# Patient Record
Sex: FEMALE | Race: WHITE | HISPANIC OR LATINO | Employment: OTHER | URBAN - METROPOLITAN AREA
[De-identification: names, ages, dates, MRNs, and addresses within clinical notes are randomized per-mention and may not be internally consistent; named-entity substitution may affect disease eponyms.]

---

## 2019-06-14 ENCOUNTER — PATIENT OUTREACH (OUTPATIENT)
Dept: HEALTH INFORMATION MANAGEMENT | Facility: OTHER | Age: 49
End: 2019-06-14

## 2019-06-14 ENCOUNTER — ANESTHESIA (OUTPATIENT)
Dept: SURGERY | Facility: MEDICAL CENTER | Age: 49
End: 2019-06-14
Payer: OTHER MISCELLANEOUS

## 2019-06-14 ENCOUNTER — APPOINTMENT (OUTPATIENT)
Dept: RADIOLOGY | Facility: MEDICAL CENTER | Age: 49
End: 2019-06-14
Attending: EMERGENCY MEDICINE
Payer: OTHER MISCELLANEOUS

## 2019-06-14 ENCOUNTER — ANESTHESIA EVENT (OUTPATIENT)
Dept: SURGERY | Facility: MEDICAL CENTER | Age: 49
End: 2019-06-14
Payer: OTHER MISCELLANEOUS

## 2019-06-14 ENCOUNTER — HOSPITAL ENCOUNTER (EMERGENCY)
Facility: MEDICAL CENTER | Age: 49
End: 2019-06-14
Attending: EMERGENCY MEDICINE
Payer: OTHER MISCELLANEOUS

## 2019-06-14 VITALS
HEIGHT: 62 IN | OXYGEN SATURATION: 95 % | RESPIRATION RATE: 16 BRPM | WEIGHT: 180.78 LBS | BODY MASS INDEX: 33.27 KG/M2 | DIASTOLIC BLOOD PRESSURE: 84 MMHG | TEMPERATURE: 98 F | HEART RATE: 79 BPM | SYSTOLIC BLOOD PRESSURE: 182 MMHG

## 2019-06-14 DIAGNOSIS — N83.201 CYST OF RIGHT OVARY: ICD-10-CM

## 2019-06-14 DIAGNOSIS — R10.9 ABDOMINAL PAIN, UNSPECIFIED ABDOMINAL LOCATION: ICD-10-CM

## 2019-06-14 DIAGNOSIS — G89.18 POST-OP PAIN: Primary | ICD-10-CM

## 2019-06-14 PROBLEM — N83.512 TORSION OF LEFT OVARY AND OVARIAN PEDICLE: Status: ACTIVE | Noted: 2019-06-14

## 2019-06-14 PROBLEM — N83.202 OVARIAN CYST, LEFT: Status: ACTIVE | Noted: 2019-06-14

## 2019-06-14 LAB
ALBUMIN SERPL BCP-MCNC: 4.7 G/DL (ref 3.2–4.9)
ALBUMIN/GLOB SERPL: 1.4 G/DL
ALP SERPL-CCNC: 99 U/L (ref 30–99)
ALT SERPL-CCNC: 15 U/L (ref 2–50)
ANION GAP SERPL CALC-SCNC: 11 MMOL/L (ref 0–11.9)
APTT PPP: 25.4 SEC (ref 24.7–36)
AST SERPL-CCNC: 17 U/L (ref 12–45)
B-HCG FREE SERPL-ACNC: <5 MIU/ML
BASOPHILS # BLD AUTO: 0.3 % (ref 0–1.8)
BASOPHILS # BLD: 0.03 K/UL (ref 0–0.12)
BILIRUB SERPL-MCNC: 0.5 MG/DL (ref 0.1–1.5)
BUN SERPL-MCNC: 14 MG/DL (ref 8–22)
CALCIUM SERPL-MCNC: 9.3 MG/DL (ref 8.5–10.5)
CHLORIDE SERPL-SCNC: 102 MMOL/L (ref 96–112)
CO2 SERPL-SCNC: 23 MMOL/L (ref 20–33)
CREAT SERPL-MCNC: 0.76 MG/DL (ref 0.5–1.4)
EOSINOPHIL # BLD AUTO: 0.01 K/UL (ref 0–0.51)
EOSINOPHIL NFR BLD: 0.1 % (ref 0–6.9)
ERYTHROCYTE [DISTWIDTH] IN BLOOD BY AUTOMATED COUNT: 45.8 FL (ref 35.9–50)
GLOBULIN SER CALC-MCNC: 3.3 G/DL (ref 1.9–3.5)
GLUCOSE SERPL-MCNC: 154 MG/DL (ref 65–99)
HCG SERPL QL: NEGATIVE
HCT VFR BLD AUTO: 34.8 % (ref 37–47)
HGB BLD-MCNC: 11.5 G/DL (ref 12–16)
IHCGL IHCGL: NEGATIVE MIU/ML
IMM GRANULOCYTES # BLD AUTO: 0.04 K/UL (ref 0–0.11)
IMM GRANULOCYTES NFR BLD AUTO: 0.4 % (ref 0–0.9)
INR PPP: 0.96 (ref 0.87–1.13)
LIPASE SERPL-CCNC: <3 U/L (ref 11–82)
LYMPHOCYTES # BLD AUTO: 0.9 K/UL (ref 1–4.8)
LYMPHOCYTES NFR BLD: 8.5 % (ref 22–41)
MCH RBC QN AUTO: 30.9 PG (ref 27–33)
MCHC RBC AUTO-ENTMCNC: 33 G/DL (ref 33.6–35)
MCV RBC AUTO: 93.5 FL (ref 81.4–97.8)
MONOCYTES # BLD AUTO: 0.23 K/UL (ref 0–0.85)
MONOCYTES NFR BLD AUTO: 2.2 % (ref 0–13.4)
NEUTROPHILS # BLD AUTO: 9.34 K/UL (ref 2–7.15)
NEUTROPHILS NFR BLD: 88.5 % (ref 44–72)
NRBC # BLD AUTO: 0 K/UL
NRBC BLD-RTO: 0 /100 WBC
PATHOLOGY CONSULT NOTE: NORMAL
PLATELET # BLD AUTO: 246 K/UL (ref 164–446)
PMV BLD AUTO: 10.9 FL (ref 9–12.9)
POTASSIUM SERPL-SCNC: 4 MMOL/L (ref 3.6–5.5)
PROT SERPL-MCNC: 8 G/DL (ref 6–8.2)
PROTHROMBIN TIME: 13 SEC (ref 12–14.6)
RBC # BLD AUTO: 3.72 M/UL (ref 4.2–5.4)
SODIUM SERPL-SCNC: 136 MMOL/L (ref 135–145)
WBC # BLD AUTO: 10.6 K/UL (ref 4.8–10.8)

## 2019-06-14 PROCEDURE — 83690 ASSAY OF LIPASE: CPT

## 2019-06-14 PROCEDURE — 96376 TX/PRO/DX INJ SAME DRUG ADON: CPT

## 2019-06-14 PROCEDURE — 501838 HCHG SUTURE GENERAL: Performed by: SPECIALIST

## 2019-06-14 PROCEDURE — 160036 HCHG PACU - EA ADDL 30 MINS PHASE I: Performed by: SPECIALIST

## 2019-06-14 PROCEDURE — 99291 CRITICAL CARE FIRST HOUR: CPT

## 2019-06-14 PROCEDURE — 160025 RECOVERY II MINUTES (STATS): Performed by: SPECIALIST

## 2019-06-14 PROCEDURE — 160041 HCHG SURGERY MINUTES - EA ADDL 1 MIN LEVEL 4: Performed by: SPECIALIST

## 2019-06-14 PROCEDURE — A9270 NON-COVERED ITEM OR SERVICE: HCPCS | Performed by: ANESTHESIOLOGY

## 2019-06-14 PROCEDURE — 96374 THER/PROPH/DIAG INJ IV PUSH: CPT

## 2019-06-14 PROCEDURE — 96375 TX/PRO/DX INJ NEW DRUG ADDON: CPT

## 2019-06-14 PROCEDURE — 85730 THROMBOPLASTIN TIME PARTIAL: CPT

## 2019-06-14 PROCEDURE — 502703 HCHG DEVICE, LIGASURE V SEALER: Performed by: SPECIALIST

## 2019-06-14 PROCEDURE — 700102 HCHG RX REV CODE 250 W/ 637 OVERRIDE(OP): Performed by: ANESTHESIOLOGY

## 2019-06-14 PROCEDURE — 700111 HCHG RX REV CODE 636 W/ 250 OVERRIDE (IP): Performed by: ANESTHESIOLOGY

## 2019-06-14 PROCEDURE — 500886 HCHG PACK, LAPAROSCOPY: Performed by: SPECIALIST

## 2019-06-14 PROCEDURE — 160048 HCHG OR STATISTICAL LEVEL 1-5: Performed by: SPECIALIST

## 2019-06-14 PROCEDURE — 501577 HCHG TROCAR, STEP 11MM: Performed by: SPECIALIST

## 2019-06-14 PROCEDURE — 700111 HCHG RX REV CODE 636 W/ 250 OVERRIDE (IP): Performed by: EMERGENCY MEDICINE

## 2019-06-14 PROCEDURE — 700105 HCHG RX REV CODE 258: Performed by: SPECIALIST

## 2019-06-14 PROCEDURE — 88305 TISSUE EXAM BY PATHOLOGIST: CPT

## 2019-06-14 PROCEDURE — 84703 CHORIONIC GONADOTROPIN ASSAY: CPT

## 2019-06-14 PROCEDURE — 501579 HCHG TROCAR, STEP 5MM: Performed by: SPECIALIST

## 2019-06-14 PROCEDURE — A4338 INDWELLING CATHETER LATEX: HCPCS | Performed by: SPECIALIST

## 2019-06-14 PROCEDURE — 160035 HCHG PACU - 1ST 60 MINS PHASE I: Performed by: SPECIALIST

## 2019-06-14 PROCEDURE — 84702 CHORIONIC GONADOTROPIN TEST: CPT

## 2019-06-14 PROCEDURE — 85025 COMPLETE CBC W/AUTO DIFF WBC: CPT

## 2019-06-14 PROCEDURE — 160009 HCHG ANES TIME/MIN: Performed by: SPECIALIST

## 2019-06-14 PROCEDURE — 160002 HCHG RECOVERY MINUTES (STAT): Performed by: SPECIALIST

## 2019-06-14 PROCEDURE — 160046 HCHG PACU - 1ST 60 MINS PHASE II: Performed by: SPECIALIST

## 2019-06-14 PROCEDURE — 700101 HCHG RX REV CODE 250: Performed by: ANESTHESIOLOGY

## 2019-06-14 PROCEDURE — 85610 PROTHROMBIN TIME: CPT

## 2019-06-14 PROCEDURE — 500854 HCHG NEEDLE, INSUFFLATION FOR STEP: Performed by: SPECIALIST

## 2019-06-14 PROCEDURE — 500002 HCHG ADHESIVE, DERMABOND: Performed by: SPECIALIST

## 2019-06-14 PROCEDURE — 160047 HCHG PACU  - EA ADDL 30 MINS PHASE II: Performed by: SPECIALIST

## 2019-06-14 PROCEDURE — 94760 N-INVAS EAR/PLS OXIMETRY 1: CPT

## 2019-06-14 PROCEDURE — 74176 CT ABD & PELVIS W/O CONTRAST: CPT

## 2019-06-14 PROCEDURE — 80053 COMPREHEN METABOLIC PANEL: CPT

## 2019-06-14 PROCEDURE — 36415 COLL VENOUS BLD VENIPUNCTURE: CPT

## 2019-06-14 PROCEDURE — 160029 HCHG SURGERY MINUTES - 1ST 30 MINS LEVEL 4: Performed by: SPECIALIST

## 2019-06-14 PROCEDURE — 76856 US EXAM PELVIC COMPLETE: CPT

## 2019-06-14 RX ORDER — HYDROMORPHONE HYDROCHLORIDE 1 MG/ML
0.4 INJECTION, SOLUTION INTRAMUSCULAR; INTRAVENOUS; SUBCUTANEOUS
Status: DISCONTINUED | OUTPATIENT
Start: 2019-06-14 | End: 2019-06-14 | Stop reason: ALTCHOICE

## 2019-06-14 RX ORDER — HALOPERIDOL 5 MG/ML
1 INJECTION INTRAMUSCULAR
Status: DISCONTINUED | OUTPATIENT
Start: 2019-06-14 | End: 2019-06-14 | Stop reason: ALTCHOICE

## 2019-06-14 RX ORDER — OXYCODONE HCL 5 MG/5 ML
5 SOLUTION, ORAL ORAL
Status: DISCONTINUED | OUTPATIENT
Start: 2019-06-14 | End: 2019-06-14 | Stop reason: ALTCHOICE

## 2019-06-14 RX ORDER — DIPHENHYDRAMINE HYDROCHLORIDE 50 MG/ML
12.5 INJECTION INTRAMUSCULAR; INTRAVENOUS
Status: DISCONTINUED | OUTPATIENT
Start: 2019-06-14 | End: 2019-06-14 | Stop reason: ALTCHOICE

## 2019-06-14 RX ORDER — OXYCODONE HCL 5 MG/5 ML
5 SOLUTION, ORAL ORAL
Status: COMPLETED | OUTPATIENT
Start: 2019-06-14 | End: 2019-06-14

## 2019-06-14 RX ORDER — HYDROMORPHONE HYDROCHLORIDE 1 MG/ML
0.1 INJECTION, SOLUTION INTRAMUSCULAR; INTRAVENOUS; SUBCUTANEOUS
Status: DISCONTINUED | OUTPATIENT
Start: 2019-06-14 | End: 2019-06-14 | Stop reason: ALTCHOICE

## 2019-06-14 RX ORDER — KETOROLAC TROMETHAMINE 30 MG/ML
15 INJECTION, SOLUTION INTRAMUSCULAR; INTRAVENOUS ONCE
Status: DISCONTINUED | OUTPATIENT
Start: 2019-06-14 | End: 2019-06-14 | Stop reason: HOSPADM

## 2019-06-14 RX ORDER — MORPHINE SULFATE 4 MG/ML
1 INJECTION, SOLUTION INTRAMUSCULAR; INTRAVENOUS
Status: DISCONTINUED | OUTPATIENT
Start: 2019-06-14 | End: 2019-06-14 | Stop reason: HOSPADM

## 2019-06-14 RX ORDER — HYDROMORPHONE HYDROCHLORIDE 1 MG/ML
0.2 INJECTION, SOLUTION INTRAMUSCULAR; INTRAVENOUS; SUBCUTANEOUS
Status: DISCONTINUED | OUTPATIENT
Start: 2019-06-14 | End: 2019-06-14 | Stop reason: ALTCHOICE

## 2019-06-14 RX ORDER — SODIUM CHLORIDE, SODIUM LACTATE, POTASSIUM CHLORIDE, CALCIUM CHLORIDE 600; 310; 30; 20 MG/100ML; MG/100ML; MG/100ML; MG/100ML
INJECTION, SOLUTION INTRAVENOUS CONTINUOUS
Status: DISCONTINUED | OUTPATIENT
Start: 2019-06-14 | End: 2019-06-14 | Stop reason: HOSPADM

## 2019-06-14 RX ORDER — KETOROLAC TROMETHAMINE 30 MG/ML
30 INJECTION, SOLUTION INTRAMUSCULAR; INTRAVENOUS ONCE
Status: COMPLETED | OUTPATIENT
Start: 2019-06-14 | End: 2019-06-14

## 2019-06-14 RX ORDER — OXYCODONE HCL 10 MG/1
10 TABLET, FILM COATED, EXTENDED RELEASE ORAL ONCE
Status: DISCONTINUED | OUTPATIENT
Start: 2019-06-14 | End: 2019-06-14 | Stop reason: HOSPADM

## 2019-06-14 RX ORDER — HYDROMORPHONE HYDROCHLORIDE 1 MG/ML
1 INJECTION, SOLUTION INTRAMUSCULAR; INTRAVENOUS; SUBCUTANEOUS ONCE
Status: COMPLETED | OUTPATIENT
Start: 2019-06-14 | End: 2019-06-14

## 2019-06-14 RX ORDER — MORPHINE SULFATE 10 MG/ML
5 INJECTION, SOLUTION INTRAMUSCULAR; INTRAVENOUS
Status: DISCONTINUED | OUTPATIENT
Start: 2019-06-14 | End: 2019-06-14 | Stop reason: HOSPADM

## 2019-06-14 RX ORDER — ONDANSETRON 2 MG/ML
4 INJECTION INTRAMUSCULAR; INTRAVENOUS
Status: DISCONTINUED | OUTPATIENT
Start: 2019-06-14 | End: 2019-06-14 | Stop reason: ALTCHOICE

## 2019-06-14 RX ORDER — SODIUM CHLORIDE, SODIUM LACTATE, POTASSIUM CHLORIDE, CALCIUM CHLORIDE 600; 310; 30; 20 MG/100ML; MG/100ML; MG/100ML; MG/100ML
INJECTION, SOLUTION INTRAVENOUS CONTINUOUS
Status: DISCONTINUED | OUTPATIENT
Start: 2019-06-14 | End: 2019-06-14 | Stop reason: ALTCHOICE

## 2019-06-14 RX ORDER — ONDANSETRON 2 MG/ML
4 INJECTION INTRAMUSCULAR; INTRAVENOUS
Status: DISCONTINUED | OUTPATIENT
Start: 2019-06-14 | End: 2019-06-14 | Stop reason: HOSPADM

## 2019-06-14 RX ORDER — OXYCODONE HYDROCHLORIDE AND ACETAMINOPHEN 5; 325 MG/1; MG/1
1 TABLET ORAL EVERY 6 HOURS PRN
Qty: 28 TAB | Refills: 0 | Status: SHIPPED | OUTPATIENT
Start: 2019-06-14 | End: 2019-06-21

## 2019-06-14 RX ORDER — SIMETHICONE 80 MG
80 TABLET,CHEWABLE ORAL EVERY 8 HOURS PRN
Status: DISCONTINUED | OUTPATIENT
Start: 2019-06-14 | End: 2019-06-14 | Stop reason: HOSPADM

## 2019-06-14 RX ORDER — ACETAMINOPHEN 500 MG
1000 TABLET ORAL ONCE
Status: DISCONTINUED | OUTPATIENT
Start: 2019-06-14 | End: 2019-06-14 | Stop reason: HOSPADM

## 2019-06-14 RX ORDER — OXYCODONE HCL 5 MG/5 ML
10 SOLUTION, ORAL ORAL
Status: DISCONTINUED | OUTPATIENT
Start: 2019-06-14 | End: 2019-06-14 | Stop reason: ALTCHOICE

## 2019-06-14 RX ORDER — GABAPENTIN 300 MG/1
300 CAPSULE ORAL ONCE
Status: DISCONTINUED | OUTPATIENT
Start: 2019-06-14 | End: 2019-06-14 | Stop reason: HOSPADM

## 2019-06-14 RX ORDER — CELECOXIB 200 MG/1
200 CAPSULE ORAL ONCE
Status: DISCONTINUED | OUTPATIENT
Start: 2019-06-14 | End: 2019-06-14 | Stop reason: HOSPADM

## 2019-06-14 RX ORDER — KETOROLAC TROMETHAMINE 30 MG/ML
INJECTION, SOLUTION INTRAMUSCULAR; INTRAVENOUS PRN
Status: DISCONTINUED | OUTPATIENT
Start: 2019-06-14 | End: 2019-06-14 | Stop reason: SURG

## 2019-06-14 RX ORDER — ONDANSETRON 2 MG/ML
INJECTION INTRAMUSCULAR; INTRAVENOUS PRN
Status: DISCONTINUED | OUTPATIENT
Start: 2019-06-14 | End: 2019-06-14 | Stop reason: SURG

## 2019-06-14 RX ORDER — HYDROMORPHONE HYDROCHLORIDE 1 MG/ML
1 INJECTION, SOLUTION INTRAMUSCULAR; INTRAVENOUS; SUBCUTANEOUS ONCE
Status: DISCONTINUED | OUTPATIENT
Start: 2019-06-14 | End: 2019-06-14

## 2019-06-14 RX ORDER — DEXAMETHASONE SODIUM PHOSPHATE 4 MG/ML
INJECTION, SOLUTION INTRA-ARTICULAR; INTRALESIONAL; INTRAMUSCULAR; INTRAVENOUS; SOFT TISSUE PRN
Status: DISCONTINUED | OUTPATIENT
Start: 2019-06-14 | End: 2019-06-14 | Stop reason: SURG

## 2019-06-14 RX ORDER — IBUPROFEN 800 MG/1
800 TABLET ORAL EVERY 8 HOURS PRN
Qty: 30 TAB | Refills: 0 | Status: SHIPPED | OUTPATIENT
Start: 2019-06-14

## 2019-06-14 RX ORDER — MORPHINE SULFATE 4 MG/ML
2 INJECTION, SOLUTION INTRAMUSCULAR; INTRAVENOUS
Status: DISCONTINUED | OUTPATIENT
Start: 2019-06-14 | End: 2019-06-14 | Stop reason: HOSPADM

## 2019-06-14 RX ORDER — DIPHENHYDRAMINE HYDROCHLORIDE 50 MG/ML
12.5 INJECTION INTRAMUSCULAR; INTRAVENOUS
Status: DISCONTINUED | OUTPATIENT
Start: 2019-06-14 | End: 2019-06-14 | Stop reason: HOSPADM

## 2019-06-14 RX ORDER — MEPERIDINE HYDROCHLORIDE 25 MG/ML
6.25 INJECTION INTRAMUSCULAR; INTRAVENOUS; SUBCUTANEOUS
Status: DISCONTINUED | OUTPATIENT
Start: 2019-06-14 | End: 2019-06-14 | Stop reason: ALTCHOICE

## 2019-06-14 RX ORDER — HALOPERIDOL 5 MG/ML
1 INJECTION INTRAMUSCULAR
Status: DISCONTINUED | OUTPATIENT
Start: 2019-06-14 | End: 2019-06-14 | Stop reason: HOSPADM

## 2019-06-14 RX ORDER — MAGNESIUM SULFATE HEPTAHYDRATE 40 MG/ML
INJECTION, SOLUTION INTRAVENOUS PRN
Status: DISCONTINUED | OUTPATIENT
Start: 2019-06-14 | End: 2019-06-14 | Stop reason: SURG

## 2019-06-14 RX ADMIN — HYDROMORPHONE HYDROCHLORIDE 1 MG: 1 INJECTION, SOLUTION INTRAMUSCULAR; INTRAVENOUS; SUBCUTANEOUS at 02:10

## 2019-06-14 RX ADMIN — FENTANYL CITRATE 50 MCG: 50 INJECTION INTRAMUSCULAR; INTRAVENOUS at 15:43

## 2019-06-14 RX ADMIN — MAGNESIUM SULFATE IN WATER 4 G: 40 INJECTION, SOLUTION INTRAVENOUS at 12:45

## 2019-06-14 RX ADMIN — PROPOFOL 150 MG: 10 INJECTION, EMULSION INTRAVENOUS at 12:16

## 2019-06-14 RX ADMIN — DEXAMETHASONE SODIUM PHOSPHATE 4 MG: 4 INJECTION, SOLUTION INTRA-ARTICULAR; INTRALESIONAL; INTRAMUSCULAR; INTRAVENOUS; SOFT TISSUE at 12:35

## 2019-06-14 RX ADMIN — PROPOFOL 50 MG: 10 INJECTION, EMULSION INTRAVENOUS at 12:20

## 2019-06-14 RX ADMIN — ROCURONIUM BROMIDE 50 MG: 10 INJECTION, SOLUTION INTRAVENOUS at 12:16

## 2019-06-14 RX ADMIN — HYDROMORPHONE HYDROCHLORIDE 1 MG: 1 INJECTION, SOLUTION INTRAMUSCULAR; INTRAVENOUS; SUBCUTANEOUS at 08:05

## 2019-06-14 RX ADMIN — HYDROMORPHONE HYDROCHLORIDE 1 MG: 1 INJECTION, SOLUTION INTRAMUSCULAR; INTRAVENOUS; SUBCUTANEOUS at 04:51

## 2019-06-14 RX ADMIN — FENTANYL CITRATE 25 MCG: 50 INJECTION, SOLUTION INTRAMUSCULAR; INTRAVENOUS at 16:22

## 2019-06-14 RX ADMIN — OXYCODONE HYDROCHLORIDE 5 MG: 5 SOLUTION ORAL at 16:18

## 2019-06-14 RX ADMIN — ONDANSETRON 4 MG: 2 INJECTION INTRAMUSCULAR; INTRAVENOUS at 13:21

## 2019-06-14 RX ADMIN — HYDROMORPHONE HYDROCHLORIDE 1 MG: 1 INJECTION, SOLUTION INTRAMUSCULAR; INTRAVENOUS; SUBCUTANEOUS at 00:35

## 2019-06-14 RX ADMIN — LIDOCAINE HYDROCHLORIDE 100 MG: 20 INJECTION, SOLUTION INTRAVENOUS at 12:16

## 2019-06-14 RX ADMIN — KETOROLAC TROMETHAMINE 30 MG: 30 INJECTION, SOLUTION INTRAMUSCULAR at 14:36

## 2019-06-14 RX ADMIN — KETOROLAC TROMETHAMINE 30 MG: 30 INJECTION, SOLUTION INTRAMUSCULAR at 01:11

## 2019-06-14 RX ADMIN — SODIUM CHLORIDE, POTASSIUM CHLORIDE, SODIUM LACTATE AND CALCIUM CHLORIDE 1000 ML: 600; 310; 30; 20 INJECTION, SOLUTION INTRAVENOUS at 11:30

## 2019-06-14 ASSESSMENT — PAIN SCALES - GENERAL: PAIN_LEVEL: 1

## 2019-06-14 NOTE — OR SURGEON
Immediate Post OP Note    PreOp Diagnosis:   Right ovarian cyst measuring about 10 cm and this is the   ostensibly cause of the patient's sudden severe pelvic pain and ovarian   torsion cannot be ruled out.    PostOp Diagnosis:   Very large left ovarian cyst.   Left ovarian/adnexal torsion.   Intraperitoneal pelvic adhesions involving the right adnexa and in the cul-de-sac.     Procedure(s):  SALPINGO-OOPHORECTOMY, RIGHT, LAPAROSCOPIC - Wound Class: Clean Contaminated    Surgeon(s):  Karlos Barreto M.D.    Anesthesiologist/Type of Anesthesia:  Anesthesiologist: Steve Villalba M.D./General    Surgical Staff:  Circulator: Molly Medellin R.N.  Relief Circulator: Demetrice Rico R.N.  Scrub Person: Zahra Wheatley    Specimens removed if any:  ID Type Source Tests Collected by Time Destination   A : Left ovarian cyst wall and left ovary Tissue Ovary PATHOLOGY SPECIMEN Karlos Barreto M.D. 6/14/2019 12:58 PM        Estimated Blood Loss:   The intraoperative blood loss was less than 100 cc's.     Findings:   Pelvic exam under anesthesia revealed a large pelvic mass.   At laparoscopy a very large deeply cyanotic left ovarian mass/cyst is seen, consistent with a large left ovarian cyst and left ovarian/adnexal torsion.   The site of torsion is seen.   There are adhesions involving the right adnexa and there are adhesions in the cul-de-sac.       Complications:   None.         6/14/2019 2:37 PM Karlos Barreto M.D.

## 2019-06-14 NOTE — ED NOTES
Med Rec completed per patient  Allergies reviewed  No ORAL antibiotics in last 14 days    Patient uses an inhaler from [place]

## 2019-06-14 NOTE — OR NURSING
1446 Pt to PACU from OR. Report from anesthesia and OR RN. OA inserted by anesthesiologist, placed on 8 L blow by O2. Respirations even and unlabored. VSS. Lap stabs x4 with bandaids CDI, no bleeding to miles pad.     1511 Pt waking at this time, dc'd oral airway. Respirations even and unlabored. Pt somewhat disoriented, although declines pain/nausea. Back to sleep.     1543 Pt waking, states pain 7/10. Medicated per MAR.    1600 Orders for oral pain medication received. SO at . Called  for f/u in one week before pt returns home to [facility]. Rx given to SO, SO took rx down to [facility].    1607  returned call, Dr. Barreto's office closed at 1400 today, will leave f/u info in dc summary.     1620 Medicated for 4/10 incision pain.     1730 Pt up to bathroom. Ambulates steady gait. Able to void w/o difficulty.     1751 Discharge orders received. Meets discharge criteria at this time. Tolerable pain. No nausea. Tolerating PO. On RA. All lines and monitors discontinued. Reviewed discharge paperwork with pt and SO. Discussed diet, activity, medications, follow up care and worsening symptoms. No questions at this time. Pt to be discharged to home via private vehicle. Pt escorted out of department in wheelchair with CNA, SO, and all belongings.

## 2019-06-14 NOTE — DISCHARGE INSTRUCTIONS
ACTIVITY: Rest and take it easy for the first 24 hours.  A responsible adult is recommended to remain with you during that time.  It is normal to feel sleepy.  We encourage you to not do anything that requires balance, judgment or coordination.    MILD FLU-LIKE SYMPTOMS ARE NORMAL. YOU MAY EXPERIENCE GENERALIZED MUSCLE ACHES, THROAT IRRITATION, HEADACHE AND/OR SOME NAUSEA.    FOR 24 HOURS DO NOT:  Drive, operate machinery or run household appliances.  Drink beer or alcoholic beverages.   Make important decisions or sign legal documents.    SPECIAL INSTRUCTIONS:     See handout. Follow up with Dr. Barreto in the office in about 2 weeks for a post op visit.    DIET: To avoid nausea, slowly advance diet as tolerated, avoiding spicy or greasy foods for the first day.  Add more substantial food to your diet according to your physician's instructions.  Babies can be fed formula or breast milk as soon as they are hungry.  INCREASE FLUIDS AND FIBER TO AVOID CONSTIPATION.    FOLLOW-UP APPOINTMENT:  A follow-up appointment should be arranged with your doctor in 1-2 weeks; call to schedule.    You should CALL YOUR PHYSICIAN if you develop:  Fever greater than 101 degrees F.  Pain not relieved by medication, or persistent nausea or vomiting.  Excessive bleeding (blood soaking through dressing) or unexpected drainage from the wound.  Extreme redness or swelling around the incision site, drainage of pus or foul smelling drainage.  Inability to urinate or empty your bladder within 8 hours.  Problems with breathing or chest pain.    You should call 911 if you develop problems with breathing or chest pain.  If you are unable to contact your doctor or surgical center, you should go to the nearest emergency room or urgent care center.  Physician's telephone #: Dr. Barreto [phone]    If any questions arise, call your doctor.  If your doctor is not available, please feel free to call the Surgical Center at [phone].  The  Center is open Monday through Friday from 7AM to 7PM.  You can also call the HEALTH HOTLINE open 24 hours/day, 7 days/week and speak to a nurse at [phone], or toll free at [phone].    A registered nurse may call you a few days after your surgery to see how you are doing after your procedure.    MEDICATIONS: Resume taking daily medication.  Take prescribed pain medication with food.  If no medication is prescribed, you may take non-aspirin pain medication if needed.  PAIN MEDICATION CAN BE VERY CONSTIPATING.  Take a stool softener or laxative such as senokot, pericolace, or milk of magnesia if needed.    Prescription given for Percocet and Ibuprofen.  Last pain medication given at 4:18 pm (oxycodone 5 mg), wait until 10:18 pm to start Percocet. Toradol (NSAID) given IV in operating room at 2:36 pm, wait until 10:36 pm to start Ibuprofen.     If your physician has prescribed pain medication that includes Acetaminophen (Tylenol), do not take additional Acetaminophen (Tylenol) while taking the prescribed medication.    Depression / Suicide Risk    As you are discharged from this [facility] facility, it is important to learn how to keep safe from harming yourself.    Recognize the warning signs:  · Abrupt changes in personality, positive or negative- including increase in energy   · Giving away possessions  · Change in eating patterns- significant weight changes-  positive or negative  · Change in sleeping patterns- unable to sleep or sleeping all the time   · Unwillingness or inability to communicate  · Depression  · Unusual sadness, discouragement and loneliness  · Talk of wanting to die  · Neglect of personal appearance   · Rebelliousness- reckless behavior  · Withdrawal from people/activities they love  · Confusion- inability to concentrate     If you or a loved one observes any of these behaviors or has concerns about self-harm, here's what you can do:  · Talk about it- your feelings and reasons for  harming yourself  · Remove any means that you might use to hurt yourself (examples: pills, rope, extension cords, firearm)  · Get professional help from the community (Mental Health, Substance Abuse, psychological counseling)  · Do not be alone:Call your Safe Contact- someone whom you trust who will be there for you.  · Call your local CRISIS HOTLINE 556-4679 or 404-074-5998  · Call your local Children's Mobile Crisis Response Team [place] (006) 794-1869 or www.[employer]  · Call the toll free National Suicide Prevention Hotlines   · National Suicide Prevention Lifeline 846-540-MUHH (6532)  · National Hope Line Network 800-SUICIDE (891-1393)

## 2019-06-14 NOTE — ED PROVIDER NOTES
"ED Provider Note    ED Provider Note      Primary care provider: No primary care provider on file.    CHIEF COMPLAINT  Chief Complaint   Patient presents with   • LLQ Pain       BROOKE Adame is a 48 y.o. female who presents to the Emergency Department with chief complaint of abdominal pain.  Patient reports severe left lower quadrant abdominal pain acute onset at 4 PM today.  She reports the pain is colicky at set of 10 out of 10 sharp stabbing no alleviating or aggravating factors it does radiate slightly in the left lower back.  She is had multiple episodes of emesis no blood within the emesis she is had no diarrhea she said chills without measured and she denies any urinary complaints no dysuria urinary hesitancy urgency hematuria.  She her last menstrual cycle was 1 week prior and she is had no abnormal vaginal bleeding or discharge.  She had one similar episode of pain years prior without medical evaluation at that time that resolved spontaneously.    REVIEW OF SYSTEMS  10 systems reviewed and otherwise negative, pertinent positives and negatives listed in the history of present illness.    PAST MEDICAL HISTORY   has a past medical history of Asthma.    SURGICAL HISTORY  patient denies any surgical history    SOCIAL HISTORY  Social History   Substance Use Topics   • Smoking status: Never Smoker   • Smokeless tobacco: Never Used   • Alcohol use Yes      Comment: occ      History   Drug Use No       FAMILY HISTORY  Non-Contributory    CURRENT MEDICATIONS  Home Medications     Reviewed by Colleen Sellers R.N. (Registered Nurse) on 06/14/19 at 0010  Med List Status: Not Addressed   Medication Last Dose Status   NS SOLN 60 mL with albuterol 2.5 mg/0.5 mL NEBU 5 mL  Active                ALLERGIES  No Known Allergies    PHYSICAL EXAM  VITAL SIGNS: BP (!) 166/91   Pulse 79   Temp 36.6 °C (97.9 °F) (Temporal)   Resp 16   Ht 1.575 m (5' 2\")   Wt 82 kg (180 lb 12.4 oz)   LMP 06/11/2019 (Exact Date)   " SpO2 98%   BMI 33.06 kg/m²   Pulse ox interpretation: I interpret this pulse ox as normal.  Constitutional: Alert and oriented x 3, moderate distress  HEENT: Atraumatic normocephalic, pupils are equal round reactive to light extraocular movements are intact. The nares is clear, external ears are normal, mouth shows moist mucous membranes  Neck: Supple, no JVD no tracheal deviation  Cardiovascular: Regular rate and rhythm no murmur rub or gallop 2+ pulses peripherally x4  Thorax & Lungs: No respiratory distress, no wheezes rales or rhonchi, No chest tenderness.   GI: Tender to palpation in the left lower quadrant and the left flank no rebound or guarding positive bowel sounds nondistended  Skin: Warm dry no acute rash or lesion  Musculoskeletal: Moving all extremities with full range and 5 of 5 strength, no acute  deformity  Neurologic: Cranial nerves III through XII are grossly intact, no sensory deficit, no cerebellar dysfunction   Psychiatric: Appropriate affect for situation at this time      DIAGNOSTIC STUDIES / PROCEDURES  LABS    Results for orders placed or performed during the hospital encounter of 06/14/19   CBC WITH DIFFERENTIAL   Result Value Ref Range    WBC 10.6 4.8 - 10.8 K/uL    RBC 3.72 (L) 4.20 - 5.40 M/uL    Hemoglobin 11.5 (L) 12.0 - 16.0 g/dL    Hematocrit 34.8 (L) 37.0 - 47.0 %    MCV 93.5 81.4 - 97.8 fL    MCH 30.9 27.0 - 33.0 pg    MCHC 33.0 (L) 33.6 - 35.0 g/dL    RDW 45.8 35.9 - 50.0 fL    Platelet Count 246 164 - 446 K/uL    MPV 10.9 9.0 - 12.9 fL    Neutrophils-Polys 88.50 (H) 44.00 - 72.00 %    Lymphocytes 8.50 (L) 22.00 - 41.00 %    Monocytes 2.20 0.00 - 13.40 %    Eosinophils 0.10 0.00 - 6.90 %    Basophils 0.30 0.00 - 1.80 %    Immature Granulocytes 0.40 0.00 - 0.90 %    Nucleated RBC 0.00 /100 WBC    Neutrophils (Absolute) 9.34 (H) 2.00 - 7.15 K/uL    Lymphs (Absolute) 0.90 (L) 1.00 - 4.80 K/uL    Monos (Absolute) 0.23 0.00 - 0.85 K/uL    Eos (Absolute) 0.01 0.00 - 0.51 K/uL    Baso  (Absolute) 0.03 0.00 - 0.12 K/uL    Immature Granulocytes (abs) 0.04 0.00 - 0.11 K/uL    NRBC (Absolute) 0.00 K/uL   COMP METABOLIC PANEL   Result Value Ref Range    Sodium 136 135 - 145 mmol/L    Potassium 4.0 3.6 - 5.5 mmol/L    Chloride 102 96 - 112 mmol/L    Co2 23 20 - 33 mmol/L    Anion Gap 11.0 0.0 - 11.9    Glucose 154 (H) 65 - 99 mg/dL    Bun 14 8 - 22 mg/dL    Creatinine 0.76 0.50 - 1.40 mg/dL    Calcium 9.3 8.5 - 10.5 mg/dL    AST(SGOT) 17 12 - 45 U/L    ALT(SGPT) 15 2 - 50 U/L    Alkaline Phosphatase 99 30 - 99 U/L    Total Bilirubin 0.5 0.1 - 1.5 mg/dL    Albumin 4.7 3.2 - 4.9 g/dL    Total Protein 8.0 6.0 - 8.2 g/dL    Globulin 3.3 1.9 - 3.5 g/dL    A-G Ratio 1.4 g/dL   LIPASE   Result Value Ref Range    Lipase <3 (L) 11 - 82 U/L   PT/INR   Result Value Ref Range    PT 13.0 12.0 - 14.6 sec    INR 0.96 0.87 - 1.13   PTT   Result Value Ref Range    APTT 25.4 24.7 - 36.0 sec   ESTIMATED GFR   Result Value Ref Range    GFR If African American >60 >60 mL/min/1.73 m 2    GFR If Non African American >60 >60 mL/min/1.73 m 2     \  No results found for this or any previous visit.    All labs reviewed by me.      RADIOLOGY  US-PELVIC COMPLETE (TRANSABDOMINAL/TRANSVAGINAL) (COMBO)   Final Result         Large cystic lesion in the right ovary measuring up to 10.3 cm. No septation or solid component seen. However, due to the size of the lesion, surgical consultation is still recommended.      Heterogeneous uterus.      Nonvisualization of the left ovary.      CT-RENAL COLIC EVALUATION(A/P W/O)   Final Result         1. No urinary tract calculus identified. No renal collecting system in dilatation.      2. There is 11.2 x 9.5 x 11.5 cm cystic mass in the right adnexa which could arise from the right ovary. This lesion could be further evaluated by ultrasound and MRI. Surgical consultation is recommended.        The radiologist's interpretation of all radiological studies have been reviewed by me.    COURSE & MEDICAL  "DECISION MAKING  Pertinent Labs & Imaging studies reviewed. (See chart for details)    12:47 AM - Patient seen and examined at bedside.     Coags were ordered in light of possibility of acute surgical emergency    Patient noted to have slightly elevated blood pressure likely circumstantial secondary to presenting complaint. Referred to primary care physician for further evaluation.        Medical Decision Making: Patient presents with severe left-sided lower quadrant pain initial concern was for possible ureterolithiasis/kidney stone.  Patient was treated with IV Dilaudid at arrival and given IV Toradol shortly thereafter CT scan demonstrates large cystic mass in the pelvis which possibly arising from the right ovary.  Ultrasound confirms large cystic mass originating from the right ovary no solid or component no septations however extremely large measurements are approximately 10 x 12 cm.  Patient's required several doses of IV Dilaudid here.  She is somewhat more comfortable at this time she is traveling here from [place] therefore does not have an established gynecologist in town I discussed the case with  who is on the labor and delivery delivering a child stated he would be down for evaluation as soon as that was completed.  This consultation pending at time of shift change I anticipate that be taken to the operating room for this to be determined by the specialist consulted.   Patient was signed out to my partner at shift change she will reassess and provide any interventions as necessary.  BP (!) 166/91   Pulse 78   Temp 36.6 °C (97.9 °F) (Temporal)   Resp 13   Ht 1.575 m (5' 2\")   Wt 82 kg (180 lb 12.4 oz)   LMP 06/11/2019 (Exact Date)   SpO2 96%   BMI 33.06 kg/m²             FINAL IMPRESSION  1. Abdominal pain, unspecified abdominal location Active   2. Cyst of right ovary Active         This dictation has been created using voice recognition software and/or scribes. The accuracy of the " dictation is limited by the abilities of the software and the expertise of the scribes. I expect there may be some errors of grammar and possibly content. I made every attempt to manually correct the errors within my dictation. However, errors related to voice recognition software and/or scribes may still exist and should be interpreted within the appropriate context.

## 2019-06-14 NOTE — ANESTHESIA PREPROCEDURE EVALUATION
Abd pain    Relevant Problems   No relevant active problems       Physical Exam    Airway   Mallampati: III  TM distance: <3 FB  Neck ROM: full       Cardiovascular   Rhythm: regular  Rate: normal     Dental - normal exam         Pulmonary    Abdominal    Neurological - normal exam                 Anesthesia Plan    ASA 2       Plan - general       Airway plan will be ETT        Induction: intravenous          Informed Consent:    Anesthetic plan and risks discussed with patient.

## 2019-06-14 NOTE — H&P
"IDENTIFICATION:  The patient is a very pleasant 48-year-old nullipara.    CHIEF COMPLAINT:  Sudden onset severe pelvic pain starting at about 4:00 in   the afternoon yesterday.    HISTORY OF PRESENT ILLNESS:  The patient is a very pleasant 48-year-old   [place]ipara, who is from [facility], who has been staying in the [place]   and for the past year or even 2 years, she has had an intermittent pelvic   pain, but starting at about 4:00 yesterday afternoon, she experienced sudden   severe pelvic pain and subsequently went to the emergency room.  Initially, it   appeared that she was having a ureteral colic, but imaging studies revealed a   large ovarian cyst.  CT scan of the abdomen and pelvis revealed according to   the report \"11.2x9.5x11.5 cm cystic mass in the right adnexa, which could   arise from the right ovary.\"  Then, a pelvic ultrasound was performed and the   report from this pelvic ultrasound states \"large cystic lesion in the right   ovary measuring up to 10.3 cm,\" and \"no septation or solid component seen,\"   and \"however, due to the size of this lesion, surgical consultation is still   recommended.\"  The patient says that her pain is relieved with IV analgesics   somewhat, but she says when the IV analgesics wear off, her pain becomes very   severe.  She denies any accompanying symptoms.    PAST MEDICAL HISTORY:  The patient says she has asthma and uses an asthma   inhaler and says she has no other medical illnesses.  She denies any history   of hypertension or diabetes.    PAST SURGICAL HISTORY:  No previous surgeries other than for tonsillectomy at   age 2.    MEDICATIONS:  The patient uses an asthma inhaler.  Otherwise, she has been on   no medications other than for those given to her in the hospital since she   arrived for this admission.    ALLERGIES:  The patient says that she has no known drug allergies.    SOCIAL HISTORY:  The patient denies smoking.  She says she consumes alcoholic "   beverages, perhaps 1 per day.  She denies use of recreational drugs.    FAMILY HISTORY:  Noncontributory.    REVIEW OF SYSTEMS:  GENERAL:  No fevers or chills or sweats.  PULMONARY:  No coughing or wheezing or chest pain or shortness of breath.  CARDIOVASCULAR:  No palpitations or dyspnea or chest pain.  GASTROINTESTINAL:  No nausea, vomiting, diarrhea, constipation.  GENITOURINARY:  The patient complains of sudden onset severe pelvic pain   starting yesterday as described above.  She says that her menses are fairly   regular and she does experience heavy blood flow with her menses   (menorrhagia).  MUSCULOSKELETAL:  No arthralgias, myalgias.  NEUROLOGIC:  No headaches or syncope or seizures.    PHYSICAL EXAMINATION:  VITAL SIGNS:  The patient's temperature is 97.9 degrees Fahrenheit.  Her heart   rate is 71 beats per minute and her respiratory rate is 15 breaths per   minute.  Her oxygen saturation is 95% on room air.  Her blood pressure was   166/91.  Other blood pressures were 169/85 and 166/82.  GENERAL:  The patient appears well developed and well nourished and relaxed   and alert and comfortable and in no apparent distress.  HEENT:  Normocephalic, atraumatic.  CHEST AND HEART:  Regular rate and rhythm.  No murmur.  LUNGS:  Clear to auscultation bilaterally.  ABDOMEN:  With the patient in the dorsal supine position, the abdomen is   examined and the abdomen is obese and soft and nontender and nondistended.  EXTREMITIES:  No clubbing, cyanosis or edema.  NEUROLOGICAL:  Nonfocal.    LABORATORY DATA:  The patient's white count is normal at 10.6 and her   hemoglobin and hematocrit are normal at 11.5 g/dL and 34.8% respectively and   her platelet count is normal at 246,000.  Her INR is normal at 0.96.  Liver   transaminases are normal and electrolytes are normal.    ASSESSMENT:  Right ovarian cyst measuring about 10 cm and this is the   ostensibly cause of the patient's sudden severe pelvic pain and ovarian    torsion cannot be ruled out.    PLAN:  I suggested to the patient that we go to the operating room today for   laparoscopy with laparoscopic right oophorectomy and I discussed with her and   explained to her what laparoscopy with laparoscopic right oophorectomy is and   what laparoscopy with laparoscopic right oophorectomy involves and I discussed   with her the risks and benefits and alternatives and she replied that she   would like for us to proceed with laparoscopy with right oophorectomy.  She   will be admitted to the hospital and we will plan on proceeding with a   laparoscopic right oophorectomy later today.       ____________________________________     MD CHRISTIAN Bond / NTS    DD:  06/14/2019 07:08:09  DT:  06/14/2019 08:17:32    D#:  [id number]  Job#:  [id number]

## 2019-06-14 NOTE — ANESTHESIA POSTPROCEDURE EVALUATION
Patient: Wanda Adame    Procedure Summary     Date:  06/14/19 Room / Location:  [facility] ROOM 28 / SURGERY SAME DAY [facility]    Anesthesia Start:  1212 Anesthesia Stop:  1448    Procedure:  OOPHORECTOMY, Left LAPAROSCOPIC, LEFT OVARIAN CYST REMOVAL (Left Abdomen) Diagnosis:  (large right ovarian cyst, severe pelvic pain, right ovarain tortion)    Surgeon:  Karlos Barreto M.D. Responsible Provider:  Steve Villalba M.D.    Anesthesia Type:  general ASA Status:  2          Final Anesthesia Type: general  Last vitals  BP   Blood Pressure: (!) 182/84, NIBP: 134/82    Temp   36.7 °C (98 °F)    Pulse   Pulse: (!) 59, Heart Rate (Monitored): 70   Resp   14    SpO2   94 %      Anesthesia Post Evaluation    Patient location during evaluation: bedside  Patient participation: complete - patient participated  Level of consciousness: awake  Pain score: 1    Airway patency: patent  Anesthetic complications: no  Cardiovascular status: adequate  Respiratory status: acceptable  Hydration status: acceptable    PONV: none           Nurse Pain Score: 0 (NPRS)

## 2019-06-14 NOTE — ANESTHESIA PROCEDURE NOTES
Airway  Date/Time: 6/14/2019 12:17 PM  Performed by: ROSE ENRIQUEZ  Authorized by: ROSE ENRIQUEZ     Location:  OR  Urgency:  Elective  Indications for Airway Management:  Anesthesia  Spontaneous Ventilation: absent    Sedation Level:  Deep  Preoxygenated: Yes    Patient Position:  Sniffing  Final Airway Type:  Endotracheal airway  Final Endotracheal Airway:  ETT  Cuffed: Yes    Technique Used for Successful ETT Placement:  Direct laryngoscopy  Insertion Site:  Oral  Blade Type:  Reaves  Laryngoscope Blade/Videolaryngoscope Blade Size:  2  ETT Size (mm):  7.0  Measured from:  Teeth  ETT to Teeth (cm):  22  Placement Verified by: auscultation and capnometry    Cormack-Lehane Classification:  Grade IIb - view of arytenoids or posterior of glottis only  Number of Attempts at Approach:  1

## 2019-06-14 NOTE — ANESTHESIA TIME REPORT
Anesthesia Start and Stop Event Times     Date Time Event    6/14/2019 1212 Anesthesia Start     1448 Anesthesia Stop        Responsible Staff  06/14/19    Name Role Begin End    Steve Villalba M.D. Anesth 1212 1448        Preop Diagnosis (Free Text):  Pre-op Diagnosis     large right ovarian cyst, severe pelvic pain        Preop Diagnosis (Codes):  Diagnosis Information     Diagnosis Code(s):         Post op Diagnosis  Left ovarian cyst      Premium Reason  Non-Premium    Comments:

## 2019-06-14 NOTE — ANESTHESIA PREPROCEDURE EVALUATION
Relevant Problems   Within Normal Limits   CARDIAC   ENDO   GI      NEURO   PULMONARY       Physical Exam    Airway   Mallampati: II  TM distance: >3 FB  Neck ROM: full       Cardiovascular - normal exam  Rhythm: regular  Rate: normal     Dental - normal exam         Pulmonary - normal exam     Abdominal - normal exam     Neurological - normal exam                 Anesthesia Plan    ASA 2       Plan - general       Airway plan will be ETT      Plan Factors:   Patient was not previously instructed to abstain from smoking on day of procedure.  Patient did not smoke on day of procedure.    Induction: intravenous    Postoperative Plan: Postoperative administration of opioids is intended.    Pertinent diagnostic labs and testing reviewed    Informed Consent:    Anesthetic plan and risks discussed with patient.    Use of blood products discussed with: patient whom.

## 2019-06-15 NOTE — OP REPORT
DATE OF SERVICE:  06/14/2019    PREOPERATIVE DIAGNOSIS:  Right ovarian cyst measuring 10 cm and this is the   ostensible cause of the patient's sudden severe pelvic pain and ovarian torsion   cannot be ruled out.    POSTOPERATIVE DIAGNOSES:  1.  Very large left ovarian cyst.  2.  Left ovarian/adnexal torsion.  3.  Intraperitoneal pelvic adhesions involving right adnexa and also in the   cul-de-sac.    PROCEDURE:  Laparoscopy, diagnostic and operative, with laparoscopic removal   of extremely large, torsed left adnexa and left adnexal cyst via laparoscopic   left salpingo-oophorectomy.    SURGEON:  Karlos Barreto MD    ANESTHESIA:  General endotracheal tube anesthesia.    ANESTHESIOLOGIST:  Steve Villalba MD    FINDINGS:  Bimanual exam performed under anesthesia reveals a large pelvic   mass.    At laparoscopy, a very large and deeply cyanotic left ovarian mass/cyst is   seen consistent with a large left ovarian cyst and left ovarian/adnexal   torsion.    The site of torsion is seen.    There are adhesions involving the right adnexa and there are adhesions in the   cul-de-sac.    SPECIMENS:  Left ovarian cyst and left wall of ovary and left ovary.    COMPLICATIONS:  None.    ESTIMATED BLOOD LOSS:  The actual intraoperative blood loss was less than 100   mL.    DESCRIPTION OF PROCEDURE:  After appropriate consents had been obtained, the   patient was taken to the operating room and given general anesthesia.  She was   prepped and draped in the dorsal lithotomy position.  A Randolph catheter was   noted to be in place and draining urine.  After general anesthesia was   induced, but before the patient was prepped and draped in the dorsal lithotomy   position, a bimanual exam was performed and revealed a large pelvic mass.    After anesthesia induced and after the patient was prepped and draped in the   dorsal lithotomy position and after Randolph catheter was noted to be in place   and draining urine, attention was directed to  the abdomen where a small   (approximately 1-1.5 cm) horizontal infraumbilical incision was made with a   scalpel.  This incision was continued deep to the subcutaneous tissues with   blunt dissection using Sana clamps and S retractors.  The parietal peritoneum   was entered bluntly.  The outer expandable sheath of the VersaStep trocar   system was then simply placed without the Veress needle into the peritoneal   cavity through the infraumbilical incision and then the 10-12 mm port was   placed through this sheath through the infraumbilical incision in the usual   fashion.  The central portion of this port was removed and the 10 mm 0-degree   laparoscope was inserted through the remaining sleeve and the peritoneal   cavity was insufflated and then a laparoscope was inserted through the   infraumbilical port and proper entry in the peritoneal cavity was verified   visually with laparoscope.  Immediately evident was extremely large and   extremely and deeply cyanotic left ovary and left ovarian cyst consistent with   torsion.  Bilateral 10-12 mm ports were placed in the usual fashion using the   VersaStep trocar system and it was somewhat difficult to place these ports   because of the presence of this, of course, very large left adnexal cyst.  A 5   mm port was placed suprapubically.  Later on, this port was enlarged to a 15   mm port.  Instruments were placed through the accessory ports to manipulate   the very large left ovary and left ovarian cyst and the site of torsion was   actually identified and the pictures were taken.  Later on during the   procedure, the 10 mm 0-degree laparoscope was exchanged for the 10 mm   30-degree laparoscope.  Also, the 10 mm LigaSure [place] bipolar cutting forceps   was used to thoroughly cauterize, seal and cut the site of torsion.  Then,   the 15 mm EndoCatch bag instrument was placed through the suprapubic port (the   15 mm port) and opened up in the peritoneal cavity, and with  some difficulty,   the specimen, namely the detached left adnexa with very large left ovarian   cyst was placed within the EndoCatch bag and the EndoCatch bag was fairly   large enough to accommodate this.  However, eventually, the entire left ovary   and cyst were placed within the bag and the mouth of the bag was closed over   the specimen.  The suprapubic port was removed and the mouth of the bag was   brought out with some difficulty through the suprapubic port.  The suprapubic   skin incision was extended bilaterally and slightly with a scalpel to about 4   cm and the layers underneath the skin were also extended by exposing the   fascia and extending it slightly bilaterally with scissors and also with blunt   dissection.  Retractors were put inside the bag and the part of the surface   of the cyst was seen and entered with blunt dissection with the Sana clamps   and the [email or web address] suction  was used to evacuate fluid within the cyst.    Over 600 mL of fluid were removed from the cyst.  The cyst and cyst wall and   ovary were removed in portions and this required considerable amount of time.    Finally, the vast majority of tissue was removed and the bag with the   remaining tissue was removed.  This tissue was submitted as a specimen.  The   's gloves were changed.  The fascia underneath the suprapubic port was   reapproximated with a continuous simple suture using Vicryl.  The   approximately now 4 cm suprapubic skin incision was reapproximated with   placement of several interrupted buried sutures of 4-0 Monocryl placed in the   dermis.  The laparoscopy was resumed.  The pelvis was copiously irrigated and   drained.  The adhesions involving the right adnexa were noted.  Adhesions in   the cul-de-sac were noted.  There were adhesions of the bowel to the posterior   aspect of the uterus.  The pelvis was once again copiously irrigated and   drained and hemostasis was noted to be adequate.  The  patient was taken out of   Trendelenburg position and then the laparoscope was removed and instruments   were removed and air was allowed to evacuate the peritoneal cavity.  Ports   were removed.  Each skin incision was reapproximated with placement of   multiple interrupted buried sutures of 4-0 Monocryl placed in the dermis.  The   procedure was terminated.  Final lap and needle counts were reported to be   correct x2 at the end of the procedure.  It should be noted that this   procedure involved an extra and significant level of difficulty and time.  The   procedure itself required approximately 2 hours.  This was due to the   extremely large size of the torsed left adnexa.  Removal of this was   accomplished with laparoscopy and laparotomy was avoided.  The patient   tolerated the procedure well and sent to postanesthesia recovery in stable   condition.       ____________________________________     Karlos Barreto MD    MED / [facility]    DD:  06/14/2019 15:24:48  DT:  06/14/2019 17:38:13    D#:  [id number]  Job#:  [id number]    cc: ROSE ENRIQUEZ MD

## 2019-07-11 NOTE — ED PROVIDER NOTES
ED Provider Addendum    This is an addendum from my cross coverage of patient while in the emergency department on 6/14/2019.    0600 -patient signed out to me by my colleague, Dr. Barrios, awaiting final disposition after GYN consultation for large right lower quadrant cystic mass.  Dr. Barreto aware and will evaluate patient.  Please refer to his note for complete details.    0620 -patient is been seen and evaluated by gynecology, Dr. Barreto.  Plan OR this afternoon.  He will provide admission orders.    1100 -patient resting comfortably, pain controlled with additional pain medication.  Remains n.p.o.  Awaiting OR.  Hemodynamically stable.

## (undated) DEVICE — TROCAR STEP 11MM - (3/CA)

## (undated) DEVICE — TUBING SETDISPOS HIGH FLOW II - (10/BX)

## (undated) DEVICE — SLEEVE, VASO, THIGH, MED

## (undated) DEVICE — HEAD HOLDER JUNIOR/ADULT

## (undated) DEVICE — ELECTRODE DUAL RETURN W/ CORD - (50/PK)

## (undated) DEVICE — PAD SANITARY 11IN MAXI IND WRAPPED  (12EA/PK 24PK/CA)

## (undated) DEVICE — TRAY SRGPRP PVP IOD WT PRP - (20/CA)

## (undated) DEVICE — NEEDLE INSUFFLATION FOR STEP - (12/BX)

## (undated) DEVICE — GOWN SURGEONS X-LARGE - DISP. (30/CA)

## (undated) DEVICE — SUTURE 0 VICRYL PLUS UR-6 - 27 INCH (36/BX)

## (undated) DEVICE — TUBING CLEARLINK DUO-VENT - C-FLO (48EA/CA)

## (undated) DEVICE — GLOVE BIOGEL INDICATOR SZ 7SURGICAL PF LTX - (50/BX 4BX/CA)

## (undated) DEVICE — MASK ANESTHESIA ADULT  - (100/CA)

## (undated) DEVICE — GOWN WARMING STANDARD FLEX - (30/CA)

## (undated) DEVICE — KIT  I.V. START (100EA/CA)

## (undated) DEVICE — TROCAR STEP 5MM - (3/CA)

## (undated) DEVICE — SUTURE 4-0 MONOCRYL PLUS PS-2 - 27 INCH (36/BX)

## (undated) DEVICE — PROTECTOR ULNA NERVE - (36PR/CA)

## (undated) DEVICE — BANDAID SHEER STRIP 3/4 IN (100EA/BX 12BX/CA)

## (undated) DEVICE — PACK LAPAROSCOPY - (1/CA)

## (undated) DEVICE — GLOVE BIOGEL SZ 7.5 SURGICAL PF LTX - (50PR/BX 4BX/CA)

## (undated) DEVICE — KIT ANESTHESIA W/CIRCUIT & 3/LT BAG W/FILTER (20EA/CA)

## (undated) DEVICE — TRAY FOLEY CATHETER STATLOCK 16FR SURESTEP  (10EA/CA)

## (undated) DEVICE — GLOVE BIOGEL PI INDICATOR SZ 7.5 SURGICAL PF LF -(50/BX 4BX/CA)

## (undated) DEVICE — GLOVE SZ 7 BIOGEL PI MICRO - PF LF (50PR/BX 4BX/CA)

## (undated) DEVICE — GLOVE SZ 7.5 BIOGEL PI MICRO - PF LF (50PR/BX)

## (undated) DEVICE — CATHETER IV 20 GA X 1-1/4 ---SURG.& SDS ONLY--- (50EA/BX)

## (undated) DEVICE — ELECTRODE 850 FOAM ADHESIVE - HYDROGEL RADIOTRNSPRNT (50/PK)

## (undated) DEVICE — CHLORAPREP 26 ML APPLICATOR - ORANGE TINT(25/CA)

## (undated) DEVICE — GLOVE BIOGEL INDICATOR SZ 6.5 SURGICAL PF LTX - (50PR/BX 4BX/CA)

## (undated) DEVICE — MANIFOLD NEPTUNE 1 PORT (20/PK)

## (undated) DEVICE — LACTATED RINGERS INJ 1000 ML - (14EA/CA 60CA/PF)

## (undated) DEVICE — CANISTER SUCTION 3000ML MECHANICAL FILTER AUTO SHUTOFF MEDI-VAC NONSTERILE LF DISP  (40EA/CA)

## (undated) DEVICE — LIGASURE VESSEL SEAL LAP 10MM - SINGLE USE (6EA/CA)

## (undated) DEVICE — CANISTER SUCTION RIGID RED 1500CC (40EA/CA)

## (undated) DEVICE — SODIUM CHL IRRIGATION 0.9% 1000ML (12EA/CA)

## (undated) DEVICE — TUBE CONNECTING SUCTION - CLEAR PLASTIC STERILE 72 IN (50EA/CA)

## (undated) DEVICE — DERMABOND ADVANCED - (12EA/BX)

## (undated) DEVICE — SET LEADWIRE 5 LEAD BEDSIDE DISPOSABLE ECG (1SET OF 5/EA)

## (undated) DEVICE — SUTURE GENERAL

## (undated) DEVICE — SENSOR SPO2 NEO LNCS ADHESIVE (20/BX) SEE USER NOTES

## (undated) DEVICE — SUCTION INSTRUMENT YANKAUER BULBOUS TIP W/O VENT (50EA/CA)

## (undated) DEVICE — ARMREST CRADLE FOAM - (2PR/PK 12PR/CA)

## (undated) DEVICE — TUBE E-T HI-LO CUFF 7.0MM (10EA/PK)